# Patient Record
Sex: MALE | Race: WHITE | NOT HISPANIC OR LATINO | URBAN - METROPOLITAN AREA
[De-identification: names, ages, dates, MRNs, and addresses within clinical notes are randomized per-mention and may not be internally consistent; named-entity substitution may affect disease eponyms.]

---

## 2018-01-16 ENCOUNTER — EMERGENCY (EMERGENCY)
Facility: HOSPITAL | Age: 28
LOS: 1 days | Discharge: HOME | End: 2018-01-16
Attending: EMERGENCY MEDICINE | Admitting: EMERGENCY MEDICINE
Payer: COMMERCIAL

## 2018-01-16 VITALS
OXYGEN SATURATION: 98 % | TEMPERATURE: 97 F | WEIGHT: 207.01 LBS | RESPIRATION RATE: 15 BRPM | SYSTOLIC BLOOD PRESSURE: 120 MMHG | DIASTOLIC BLOOD PRESSURE: 68 MMHG | HEART RATE: 66 BPM

## 2018-01-16 PROCEDURE — 73110 X-RAY EXAM OF WRIST: CPT

## 2018-01-16 PROCEDURE — 72125 CT NECK SPINE W/O DYE: CPT | Mod: 26

## 2018-01-16 PROCEDURE — 73010 X-RAY EXAM OF SHOULDER BLADE: CPT

## 2018-01-16 PROCEDURE — 73110 X-RAY EXAM OF WRIST: CPT | Mod: 26,RT

## 2018-01-16 PROCEDURE — 72125 CT NECK SPINE W/O DYE: CPT

## 2018-01-16 PROCEDURE — 99284 EMERGENCY DEPT VISIT MOD MDM: CPT | Mod: 25

## 2018-01-16 PROCEDURE — 99284 EMERGENCY DEPT VISIT MOD MDM: CPT

## 2018-01-16 PROCEDURE — 73010 X-RAY EXAM OF SHOULDER BLADE: CPT | Mod: 26

## 2018-01-16 PROCEDURE — 70450 CT HEAD/BRAIN W/O DYE: CPT | Mod: 26

## 2018-01-16 PROCEDURE — 73130 X-RAY EXAM OF HAND: CPT | Mod: 26,RT

## 2018-01-16 PROCEDURE — 73130 X-RAY EXAM OF HAND: CPT

## 2018-01-16 PROCEDURE — 70450 CT HEAD/BRAIN W/O DYE: CPT

## 2018-01-16 RX ORDER — ACETAMINOPHEN 500 MG
650 TABLET ORAL ONCE
Qty: 0 | Refills: 0 | Status: COMPLETED | OUTPATIENT
Start: 2018-01-16 | End: 2018-01-16

## 2018-01-16 RX ORDER — IBUPROFEN 200 MG
600 TABLET ORAL ONCE
Qty: 0 | Refills: 0 | Status: COMPLETED | OUTPATIENT
Start: 2018-01-16 | End: 2018-01-16

## 2018-01-16 RX ORDER — IBUPROFEN 200 MG
1 TABLET ORAL
Qty: 15 | Refills: 0 | OUTPATIENT
Start: 2018-01-16 | End: 2018-01-20

## 2018-01-16 RX ORDER — METHOCARBAMOL 500 MG/1
1 TABLET, FILM COATED ORAL
Qty: 15 | Refills: 0 | OUTPATIENT
Start: 2018-01-16 | End: 2018-01-20

## 2018-01-16 RX ADMIN — Medication 600 MILLIGRAM(S): at 12:50

## 2018-01-16 RX ADMIN — Medication 650 MILLIGRAM(S): at 13:32

## 2018-01-16 RX ADMIN — Medication 650 MILLIGRAM(S): at 12:50

## 2018-01-16 RX ADMIN — Medication 600 MILLIGRAM(S): at 11:53
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
## 2018-01-16 NOTE — ED PROVIDER NOTE - MEDICAL DECISION MAKING DETAILS
Pt refuses to wear c-collar. advised pt risk of not wearing collar including c-spine fx, paralysis, respiratory dysfunction. pt still refused. will do ct of  head, c-spine, Pt refuses to wear c-collar. advised pt risk of not wearing collar including c-spine fx, paralysis, respiratory dysfunction. pt still refused. will do ct of  head, c-spine, x ray of wrist, hand and scapula. ibuprofen for pain. will re-evaluate

## 2018-01-16 NOTE — ED PROVIDER NOTE - PROGRESS NOTE DETAILS
pt improved. ct and xray results reviewed with pt. pt given copy of report. will rx robaxin and ibuprofen. pt advised to follow up with spine center and pmd. pt given head injury precautions.  All questions answered and concerns addressed. pt verbalized understanding and agreement with plan and dx. pt advised to follow up with PMD. pt advised to return to ed for worsening symptoms including fever, cp, sob. will dc.

## 2018-01-16 NOTE — ED PROVIDER NOTE - PHYSICAL EXAMINATION
PE- Well developed, well-nourish, resting comfortably in NAD. Ears: No hemotypanum Nose: no epistaxis,  Eyes: PERRLA b/l, EOM intact b/l with no nystagmus Face: No raccoon eyes. No bangura signs. Able to open and close mouth with no pain. Head: +contusions left parietal Cardiac- +S1, S2, without murmurs, rubs, or gallops. Pulm- lungs CTA without wheezes, ronchi, or rales. No retractions.  Abdomen- No seatbelt sign. BS normoactive. Soft, nontender to palpation. MS: No bruising to back. No step-offs. + C-spine C2- c5 midline tenderness. Neuro: CN 2-12 intact. 5/5 STRENGTH BL UE AND BL LE Finger to nose intact. No focal deficits, A&Ox3, no gross sensory or motor deficits.  MS: + LEFT SCAPULA SPINE TTP. NO ECCHYMOSIS. NO CREPITUS. FROM OF BL UE. SENSATION GROSSLY INTACT BL UE.  MS RIGHT WRIST AND HAND: + TTP ULNAR ASPECT OF WRIST UP TO 5TH MCP AND 5TH DIGIT. NO ECCHYMOSIS, NO SWELLING, NO CREPITUS. FROM. SENSATION GROSSLY INTACT.  PV:+ 2 RADIAL BL +2 DP BL LE. NO VARICOSITIES
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
## 2023-07-31 NOTE — ED PROVIDER NOTE - RESPIRATORY NEGATIVE STATEMENT, MLM
Caller: Patient     Doctor: Crystal Nicholas    Reason for call: Calling to schedule appt.  Will call back to schedule     Call back#: [phone] no chest pain, no cough, and no shortness of breath.

## 2023-12-20 ENCOUNTER — OFFICE (OUTPATIENT)
Facility: LOCATION | Age: 33
Setting detail: OPHTHALMOLOGY
End: 2023-12-20
Payer: MEDICAID

## 2023-12-20 DIAGNOSIS — S05.01XA: ICD-10-CM

## 2023-12-20 DIAGNOSIS — H11.153: ICD-10-CM

## 2023-12-20 PROCEDURE — 99203 OFFICE O/P NEW LOW 30 MIN: CPT | Performed by: OPTOMETRIST

## 2023-12-20 ASSESSMENT — CONFRONTATIONAL VISUAL FIELD TEST (CVF)
OS_FINDINGS: FULL
OD_FINDINGS: FULL

## 2023-12-27 ENCOUNTER — OFFICE (OUTPATIENT)
Facility: LOCATION | Age: 33
Setting detail: OPHTHALMOLOGY
End: 2023-12-27
Payer: MEDICAID

## 2023-12-27 DIAGNOSIS — S05.01XA: ICD-10-CM

## 2023-12-27 DIAGNOSIS — H11.153: ICD-10-CM

## 2023-12-27 PROCEDURE — 99213 OFFICE O/P EST LOW 20 MIN: CPT | Performed by: OPTOMETRIST

## 2023-12-27 ASSESSMENT — CONFRONTATIONAL VISUAL FIELD TEST (CVF)
OD_FINDINGS: FULL
OS_FINDINGS: FULL

## 2024-02-27 NOTE — ED ADULT NURSE NOTE - NS ED NURSE LEVEL OF CONSCIOUSNESS AFFECT
Spoke to patient to schedule procedure(s) Colonoscopy       Physician to perform procedure(s) Dr. ALBERTO Sylvester  Date of Procedure (s) 6/13/24  Arrival Time 7:00 AM  Time of Procedure(s) 8:00 AM   Location of Procedure(s) [facility] Floor  Type of Rx Prep sent to patient: PEG  Instructions provided to patient via [facility]    Patient was informed on the following information and verbalized understanding. Screening questionnaire reviewed with patient and complete. If procedure requires anesthesia, a responsible adult needs to be present to accompany the patient home, patient cannot drive after receiving anesthesia. Appointment details are tentative, especially check-in time. Patient will receive a prep-op call 7 days prior to confirm check-in time for procedure. If applicable the patient should contact their pharmacy to verify Rx for procedure prep is ready for pick-up. Patient was advised to call the scheduling department at [phone] if pharmacy states no Rx is available. Patient was advised to call the endoscopy scheduling department if any questions or concerns arise.      [facility] Scheduling Department      Calm

## 2024-12-18 NOTE — ED ADULT NURSE NOTE - PMH
CERTIFICATE OF SCHOOL    December 18, 2024      Re:   Kadie Moore  [id number]  [facility] [id number]                        This is to certify that Kadie Moore has been under my care from 12/18/2024.    RESTRICTIONS: none      REMARKS: Please excuse for coming to school late, she had a doctor's appointment  this morning.        SIGNATURE:___________________________________________,   12/18/2024      Dr. Katy Escalante MD/CALI Dennis/MA        [facility]  [id number]  [place]  [address] [phone]  
No pertinent past medical history